# Patient Record
Sex: MALE | Race: WHITE | URBAN - METROPOLITAN AREA
[De-identification: names, ages, dates, MRNs, and addresses within clinical notes are randomized per-mention and may not be internally consistent; named-entity substitution may affect disease eponyms.]

---

## 2017-03-25 ENCOUNTER — EMERGENCY (EMERGENCY)
Facility: HOSPITAL | Age: 32
LOS: 1 days | Discharge: HOME | End: 2017-03-25
Attending: EMERGENCY MEDICINE | Admitting: EMERGENCY MEDICINE
Payer: MEDICAID

## 2017-03-25 VITALS
SYSTOLIC BLOOD PRESSURE: 139 MMHG | DIASTOLIC BLOOD PRESSURE: 71 MMHG | HEART RATE: 66 BPM | RESPIRATION RATE: 18 BRPM | OXYGEN SATURATION: 100 % | TEMPERATURE: 98 F

## 2017-03-25 PROCEDURE — 99283 EMERGENCY DEPT VISIT LOW MDM: CPT

## 2017-03-25 NOTE — ED PROVIDER NOTE - MEDICAL DECISION MAKING DETAILS
31 yo M pt w/ likely msk pain. Very low suspicion for fx. Pt may require outpatient MRI. Plan: DC with Ortho referral list and instructions for oral pain control.

## 2017-03-25 NOTE — ED PROVIDER NOTE - MUSCULOSKELETAL, MLM
L shoulder - TTP at the lateral posterior aspect of the shoulder. FROM. Strength 5/5. Sensation and motor intact.

## 2017-03-25 NOTE — ED PROVIDER NOTE - NS ED MD SCRIBE ATTENDING SCRIBE SECTIONS
HISTORY OF PRESENT ILLNESS/VITAL SIGNS( Pullset)/PHYSICAL EXAM/PAST MEDICAL/SURGICAL/SOCIAL HISTORY/DISPOSITION/HIV/REVIEW OF SYSTEMS

## 2017-03-25 NOTE — ED ADULT TRIAGE NOTE - CHIEF COMPLAINT QUOTE
Pt c/o left shoulder pain x2 weeks. Pt states he does heavy lifting for work and may have injured it. +ROM.

## 2017-03-25 NOTE — ED PROVIDER NOTE - OBJECTIVE STATEMENT
33 yo M pt (right hand dominant) w/ no significant PMHx presents to the ED c/o left shoulder pain x 2 weeks. States he does heavy lifting at work and believes he may have injured it. Notes increasing pain today. Denies fevers, chills, numbness/weakness/tingling, any other complaints.

## 2023-04-30 ENCOUNTER — NON-APPOINTMENT (OUTPATIENT)
Age: 38
End: 2023-04-30

## 2024-06-04 ENCOUNTER — NON-APPOINTMENT (OUTPATIENT)
Age: 39
End: 2024-06-04